# Patient Record
Sex: FEMALE | Race: WHITE | Employment: STUDENT | URBAN - METROPOLITAN AREA
[De-identification: names, ages, dates, MRNs, and addresses within clinical notes are randomized per-mention and may not be internally consistent; named-entity substitution may affect disease eponyms.]

---

## 2019-05-19 ENCOUNTER — HOSPITAL ENCOUNTER (EMERGENCY)
Facility: CLINIC | Age: 24
Discharge: HOME | End: 2019-05-19
Attending: EMERGENCY MEDICINE | Admitting: EMERGENCY MEDICINE
Payer: COMMERCIAL

## 2019-05-19 ENCOUNTER — APPOINTMENT (OUTPATIENT)
Dept: GENERAL RADIOLOGY | Facility: CLINIC | Age: 24
End: 2019-05-19
Attending: EMERGENCY MEDICINE
Payer: COMMERCIAL

## 2019-05-19 ENCOUNTER — APPOINTMENT (OUTPATIENT)
Dept: CT IMAGING | Facility: CLINIC | Age: 24
End: 2019-05-19
Attending: EMERGENCY MEDICINE
Payer: COMMERCIAL

## 2019-05-19 VITALS
WEIGHT: 130 LBS | TEMPERATURE: 98.6 F | SYSTOLIC BLOOD PRESSURE: 134 MMHG | HEIGHT: 66 IN | HEART RATE: 94 BPM | BODY MASS INDEX: 20.89 KG/M2 | RESPIRATION RATE: 14 BRPM | OXYGEN SATURATION: 99 % | DIASTOLIC BLOOD PRESSURE: 89 MMHG

## 2019-05-19 DIAGNOSIS — R56.9 SEIZURES (H): ICD-10-CM

## 2019-05-19 DIAGNOSIS — R55 VASOVAGAL EPISODE: ICD-10-CM

## 2019-05-19 LAB
ALBUMIN SERPL-MCNC: 4 G/DL (ref 3.4–5)
ALBUMIN UR-MCNC: 10 MG/DL
ALP SERPL-CCNC: 54 U/L (ref 40–150)
ALT SERPL W P-5'-P-CCNC: 21 U/L (ref 0–50)
AMPHETAMINES UR QL SCN: POSITIVE
ANION GAP SERPL CALCULATED.3IONS-SCNC: 12 MMOL/L (ref 3–14)
APPEARANCE UR: CLEAR
APTT PPP: 25 SEC (ref 22–37)
AST SERPL W P-5'-P-CCNC: 13 U/L (ref 0–45)
BACTERIA #/AREA URNS HPF: ABNORMAL /HPF
BARBITURATES UR QL: NEGATIVE
BASOPHILS # BLD AUTO: 0 10E9/L (ref 0–0.2)
BASOPHILS NFR BLD AUTO: 0.4 %
BENZODIAZ UR QL: NEGATIVE
BILIRUB SERPL-MCNC: 0.5 MG/DL (ref 0.2–1.3)
BILIRUB UR QL STRIP: NEGATIVE
BUN SERPL-MCNC: 7 MG/DL (ref 7–30)
CALCIUM SERPL-MCNC: 8.4 MG/DL (ref 8.5–10.1)
CANNABINOIDS UR QL SCN: POSITIVE
CHLORIDE SERPL-SCNC: 109 MMOL/L (ref 94–109)
CO2 SERPL-SCNC: 21 MMOL/L (ref 20–32)
COCAINE UR QL: NEGATIVE
COLOR UR AUTO: ABNORMAL
CREAT SERPL-MCNC: 0.85 MG/DL (ref 0.52–1.04)
D DIMER PPP FEU-MCNC: <0.3 UG/ML FEU (ref 0–0.5)
DIFFERENTIAL METHOD BLD: NORMAL
EOSINOPHIL # BLD AUTO: 0 10E9/L (ref 0–0.7)
EOSINOPHIL NFR BLD AUTO: 0.6 %
ERYTHROCYTE [DISTWIDTH] IN BLOOD BY AUTOMATED COUNT: 11.9 % (ref 10–15)
ETHANOL UR QL SCN: NEGATIVE
GFR SERPL CREATININE-BSD FRML MDRD: >90 ML/MIN/{1.73_M2}
GLUCOSE SERPL-MCNC: 92 MG/DL (ref 70–99)
GLUCOSE UR STRIP-MCNC: NEGATIVE MG/DL
HCG SERPL QL: NEGATIVE
HCT VFR BLD AUTO: 44.7 % (ref 35–47)
HGB BLD-MCNC: 15 G/DL (ref 11.7–15.7)
HGB UR QL STRIP: ABNORMAL
HYALINE CASTS #/AREA URNS LPF: 7 /LPF (ref 0–2)
IMM GRANULOCYTES # BLD: 0 10E9/L (ref 0–0.4)
IMM GRANULOCYTES NFR BLD: 0.4 %
INR PPP: 0.95 (ref 0.86–1.14)
KETONES UR STRIP-MCNC: 10 MG/DL
LEUKOCYTE ESTERASE UR QL STRIP: NEGATIVE
LYMPHOCYTES # BLD AUTO: 2 10E9/L (ref 0.8–5.3)
LYMPHOCYTES NFR BLD AUTO: 28.3 %
MAGNESIUM SERPL-MCNC: 2.6 MG/DL (ref 1.6–2.3)
MCH RBC QN AUTO: 31.1 PG (ref 26.5–33)
MCHC RBC AUTO-ENTMCNC: 33.6 G/DL (ref 31.5–36.5)
MCV RBC AUTO: 93 FL (ref 78–100)
MONOCYTES # BLD AUTO: 0.7 10E9/L (ref 0–1.3)
MONOCYTES NFR BLD AUTO: 10 %
MUCOUS THREADS #/AREA URNS LPF: PRESENT /LPF
NEUTROPHILS # BLD AUTO: 4.3 10E9/L (ref 1.6–8.3)
NEUTROPHILS NFR BLD AUTO: 60.3 %
NITRATE UR QL: NEGATIVE
NRBC # BLD AUTO: 0 10*3/UL
NRBC BLD AUTO-RTO: 0 /100
OPIATES UR QL SCN: NEGATIVE
PH UR STRIP: 5 PH (ref 5–7)
PLATELET # BLD AUTO: 314 10E9/L (ref 150–450)
POTASSIUM SERPL-SCNC: 3.2 MMOL/L (ref 3.4–5.3)
PROT SERPL-MCNC: 7.5 G/DL (ref 6.8–8.8)
RBC # BLD AUTO: 4.83 10E12/L (ref 3.8–5.2)
RBC #/AREA URNS AUTO: 1 /HPF (ref 0–2)
SODIUM SERPL-SCNC: 142 MMOL/L (ref 133–144)
SOURCE: ABNORMAL
SP GR UR STRIP: 1.01 (ref 1–1.03)
SQUAMOUS #/AREA URNS AUTO: 1 /HPF (ref 0–1)
TROPONIN I SERPL-MCNC: <0.015 UG/L (ref 0–0.04)
UROBILINOGEN UR STRIP-MCNC: NORMAL MG/DL (ref 0–2)
WBC # BLD AUTO: 7.2 10E9/L (ref 4–11)
WBC #/AREA URNS AUTO: 1 /HPF (ref 0–5)

## 2019-05-19 PROCEDURE — 85379 FIBRIN DEGRADATION QUANT: CPT | Performed by: EMERGENCY MEDICINE

## 2019-05-19 PROCEDURE — 80307 DRUG TEST PRSMV CHEM ANLYZR: CPT | Performed by: EMERGENCY MEDICINE

## 2019-05-19 PROCEDURE — 85610 PROTHROMBIN TIME: CPT | Performed by: EMERGENCY MEDICINE

## 2019-05-19 PROCEDURE — 81001 URINALYSIS AUTO W/SCOPE: CPT | Performed by: EMERGENCY MEDICINE

## 2019-05-19 PROCEDURE — 70450 CT HEAD/BRAIN W/O DYE: CPT

## 2019-05-19 PROCEDURE — 83735 ASSAY OF MAGNESIUM: CPT | Performed by: EMERGENCY MEDICINE

## 2019-05-19 PROCEDURE — 85025 COMPLETE CBC W/AUTO DIFF WBC: CPT | Performed by: EMERGENCY MEDICINE

## 2019-05-19 PROCEDURE — 25000128 H RX IP 250 OP 636: Performed by: EMERGENCY MEDICINE

## 2019-05-19 PROCEDURE — 93010 ELECTROCARDIOGRAM REPORT: CPT | Mod: Z6 | Performed by: EMERGENCY MEDICINE

## 2019-05-19 PROCEDURE — 80053 COMPREHEN METABOLIC PANEL: CPT | Performed by: EMERGENCY MEDICINE

## 2019-05-19 PROCEDURE — 99285 EMERGENCY DEPT VISIT HI MDM: CPT | Mod: 25 | Performed by: EMERGENCY MEDICINE

## 2019-05-19 PROCEDURE — 84703 CHORIONIC GONADOTROPIN ASSAY: CPT | Performed by: EMERGENCY MEDICINE

## 2019-05-19 PROCEDURE — 96374 THER/PROPH/DIAG INJ IV PUSH: CPT | Performed by: EMERGENCY MEDICINE

## 2019-05-19 PROCEDURE — 71045 X-RAY EXAM CHEST 1 VIEW: CPT

## 2019-05-19 PROCEDURE — 80320 DRUG SCREEN QUANTALCOHOLS: CPT | Performed by: EMERGENCY MEDICINE

## 2019-05-19 PROCEDURE — 93005 ELECTROCARDIOGRAM TRACING: CPT | Performed by: EMERGENCY MEDICINE

## 2019-05-19 PROCEDURE — 85730 THROMBOPLASTIN TIME PARTIAL: CPT | Performed by: EMERGENCY MEDICINE

## 2019-05-19 PROCEDURE — 84484 ASSAY OF TROPONIN QUANT: CPT | Performed by: EMERGENCY MEDICINE

## 2019-05-19 PROCEDURE — 96361 HYDRATE IV INFUSION ADD-ON: CPT | Performed by: EMERGENCY MEDICINE

## 2019-05-19 RX ORDER — SODIUM CHLORIDE 9 MG/ML
1000 INJECTION, SOLUTION INTRAVENOUS CONTINUOUS
Status: DISCONTINUED | OUTPATIENT
Start: 2019-05-19 | End: 2019-05-20 | Stop reason: HOSPADM

## 2019-05-19 RX ORDER — BUPROPION HYDROCHLORIDE 150 MG/1
150 TABLET ORAL EVERY MORNING
COMMUNITY

## 2019-05-19 RX ORDER — DEXTROAMPHETAMINE SACCHARATE, AMPHETAMINE ASPARTATE, DEXTROAMPHETAMINE SULFATE AND AMPHETAMINE SULFATE 2.5; 2.5; 2.5; 2.5 MG/1; MG/1; MG/1; MG/1
10 TABLET ORAL PRN
COMMUNITY

## 2019-05-19 RX ORDER — ONDANSETRON 2 MG/ML
4 INJECTION INTRAMUSCULAR; INTRAVENOUS ONCE
Status: COMPLETED | OUTPATIENT
Start: 2019-05-19 | End: 2019-05-19

## 2019-05-19 RX ADMIN — SODIUM CHLORIDE 1000 ML: 9 INJECTION, SOLUTION INTRAVENOUS at 20:28

## 2019-05-19 RX ADMIN — ONDANSETRON 4 MG: 2 INJECTION INTRAMUSCULAR; INTRAVENOUS at 20:43

## 2019-05-19 ASSESSMENT — ENCOUNTER SYMPTOMS
SEIZURES: 1
DIZZINESS: 1
VOMITING: 0
UNEXPECTED WEIGHT CHANGE: 1
NAUSEA: 1

## 2019-05-19 ASSESSMENT — MIFFLIN-ST. JEOR: SCORE: 1366.43

## 2019-05-19 NOTE — ED AVS SNAPSHOT
[facility], [place], Emergency Department  [address] [place] [id number]  Phone:  [phone]                                    Ramiro Márquez   MRN: [id number]    Department:  [facility], Emergency Department   Date of Visit:  5/19/2019           After Visit Summary Signature Page    I have received my discharge instructions, and my questions have been answered. I have discussed any challenges I see with this plan with the nurse or doctor.    ..........................................................................................................................................  Patient/Patient Representative Signature      ..........................................................................................................................................  Patient Representative Print Name and Relationship to Patient    ..................................................               ................................................  Date                                   Time    ..........................................................................................................................................  Reviewed by Signature/Title    ...................................................              ..............................................  Date                                               Time          22EPIC Rev 08/18

## 2019-05-20 NOTE — DISCHARGE INSTRUCTIONS
Please make sure that you do not drive or do not any dangerous activities do not climb high places until you have been cleared by neurology for having seizures.  This is very important because if you have a seizure while you are doing this you could get hurt.  Also if another seizure returns need to return to emergency department for further evaluation.    If you have another seizure or any other concerns develop please return to emergency department as soon as possible otherwise follow-up follow-up with the neurology clinic as soon as possible    DO not take Wellbutrim along with Ritalin.   Moreover, the combination of Sertraline and wellbitrin may also cause seizures. Please follow up with your PCP for alternatves. THC or marijiuna should not be continued    Please make an appointment to follow up with Neurology Clinic (phone: [phone]) in 2 days even if entirely better.

## 2019-05-20 NOTE — ED PROVIDER NOTES
[place] EMERGENCY DEPARTMENT ([facility])  5/19/19   History     Chief Complaint   Patient presents with     Seizures     The history is provided by the patient and a parent.     Ramiro Márquez is a 23 year old female with a medical history significant for depression, anxiety and ADD who presents to the Emergency Department for evaluation of a possible seizure.  The patient was in an auditorium with her parents when she suddenly said that she was very dizzy.  The patient also reports that she also reports having some nausea as well as an abnormal taste in her mouth.  The mother reports that the patient then lost consciousness and the mother reports that she went stiff and believed that she saw some generalized shaking.  When the patient woke up, she did not remember the event.  The patient denies any numbness or weakness.  The patient has had a recent 15 pound weight loss and she reports that for the last 3 months she has had intermittent episodes of this dizziness, nausea and abnormal taste in her mouth, but she has not lost consciousness with any of these episodes.  The patient denies ever having a syncopal/possible seizure episode in the past.    I have reviewed the Medications, Allergies, Past Medical and Surgical History, and Social History in the [employer] system.    Past Medical History:   Diagnosis Date     ADD (attention deficit disorder)      Anxiety      Depressive disorder        History reviewed. No pertinent surgical history.    History reviewed. No pertinent family history.    Social History     Tobacco Use     Smoking status: Never Smoker     Smokeless tobacco: Never Used   Substance Use Topics     Alcohol use: Yes     Comment: socially       Current Facility-Administered Medications   Medication     sodium chloride 0.9% infusion     Current Outpatient Medications   Medication     amphetamine-dextroamphetamine (ADDERALL) 10 MG tablet     buPROPion (WELLBUTRIN XL) 150 MG 24 hr tablet       "No Known Allergies      Review of Systems   Constitutional: Positive for unexpected weight change.   Gastrointestinal: Positive for nausea. Negative for vomiting.   Neurological: Positive for dizziness, seizures (possible) and syncope.   All other systems reviewed and are negative.      Physical Exam   BP: (!) 157/102  Heart Rate: 109  Temp: 98.6  F (37  C)  Resp: 16  Height: 167.6 cm (5' 6\")  Weight: 59 kg (130 lb)  SpO2: 100 %      Physical Exam   Constitutional: She is oriented to person, place, and time. No distress.   HENT:   Head: Normocephalic and atraumatic.   Eyes: Pupils are equal, round, and reactive to light. Conjunctivae and EOM are normal.   Neck: Normal range of motion. Neck supple.   Cardiovascular: Normal rate and regular rhythm. Exam reveals no gallop and no friction rub.   No murmur heard.  Pulmonary/Chest: Effort normal and breath sounds normal. No respiratory distress.   Abdominal: Soft. There is no tenderness.   Musculoskeletal: She exhibits no edema or tenderness.   Neurological: She is alert and oriented to person, place, and time. She has normal strength. No cranial nerve deficit or sensory deficit. She displays a negative Romberg sign.   Skin: Skin is warm.   Psychiatric: She has a normal mood and affect. Her behavior is normal. Judgment and thought content normal.   Nursing note and vitals reviewed.      ED Course   8:01 PM  The patient was seen and examined by Ricco Wooten MD in Room ED03.     This patient was just evaluated by myself she appears to be mildly anxious physical examination is nonfocal and her symptoms are equivocal as to whether she had a syncopal episode with the perfusion syndrome versus a an actual syncope.  Plan to evaluate for both will get neurology consultation will disposition whether work-up has been completed    Addendum 2024 hours  I have spoken to neurology regarding the findings physical exam and history at this point neurology agrees with the fact that she " needs to be worked up however they feel that if nothing acute is shown she can do an outpatient follow-up for an EEG and MRI.    Addendum 2253 hrs. at this point the work-up has been negative patient was noted to be taking Wellbutrin along with sertraline and Ritalin.  Furthermore it was noted by the urinalysis that she is also used THC.  It is believed that the combination of these medications with a PAC may also predispose to seizures.  I have spoken to the patient and she will be followed up in neurology clinic to make sure that there is no evidence of any neurological abnormalities and she will also follow-up with your primary care doctor to see what medication need to be taken off in order to avoid any further interaction.       Procedures  None       EKG Interpretation:      Interpreted by Ricco Wooten  Time reviewed: 2006  Symptoms at time of EKG: None  Rhythm:Sinust tach   Rate: 107  Axis: normal  Ectopy: none  Conduction: normal  ST Segments/ T Waves: No ST-T wave changes  Q Waves: none  Comparison to prior: No old EKG available    Clinical Impression: normal EKG          Critical Care time:  none             Labs Ordered and Resulted from Time of ED Arrival Up to the Time of Departure from the ED   COMPREHENSIVE METABOLIC PANEL - Abnormal; Notable for the following components:       Result Value    Potassium 3.2 (*)     Calcium 8.4 (*)     All other components within normal limits   UA MACROSCOPIC WITH REFLEX TO MICRO AND CULTURE - Abnormal; Notable for the following components:    Ketones Urine 10 (*)     Blood Urine Trace (*)     Protein Albumin Urine 10 (*)     Bacteria Urine Few (*)     Mucous Urine Present (*)     Hyaline Casts 7 (*)     All other components within normal limits   DRUG ABUSE SCREEN 6 CHEM DEP URINE ([facility]) - Abnormal; Notable for the following components:    Amphetamine Qual Urine Positive (*)     Cannabinoids Qual Urine Positive (*)     All other components within normal limits    MAGNESIUM - Abnormal; Notable for the following components:    Magnesium 2.6 (*)     All other components within normal limits   CBC WITH PLATELETS DIFFERENTIAL   D DIMER QUANTITATIVE   INR   PARTIAL THROMBOPLASTIN TIME   TROPONIN I   HCG QUALITATIVE   PERIPHERAL IV CATHETER   CARDIAC CONTINUOUS MONITORING            Assessments & Plan (with Medical Decision Making)   This is a 33-year-old female that comes in for the to the emergency department for evaluation of what mom describes as a seizure and what may have been an emergence reaction from a syncope (vasovagal).  I plan to evaluate this patient by getting a cardiac and neurological work-up furthermore I will get a neurology consult and disposition her as needed.    Addendum at 2145 hrs. this point the work-up is so far negative her vital signs are stable upon arrival she was not slightly tachycardic that was likely because she was anxious but that has now resolved.  I have spoken to neurology and the patient needs to follow-up as outpatient for further testing and treatments I plan to discharge her for further evaluation by neurology I have told the mom and the patient that if this happens again she will need to come back immediately and at that point if she has another episode she would need to be admitted    I have reviewed the nursing notes.    I have reviewed the findings, diagnosis, plan and need for follow up with the patient.       Medication List      There are no discharge medications for this visit.         Final diagnoses:   Seizures (H)   Vasovagal episode     IBernard, am serving as a trained medical scribe to document services personally performed by Ricco Wooten MD, based on the provider's statements to me.   IRicco MD, was physically present and have reviewed and verified the accuracy of this note documented by Bernard Marlow.    5/19/2019   [facility], EMERGENCY DEPARTMENT     Ricco Wooten MD  05/21/19  [id number]

## 2019-05-20 NOTE — ED TRIAGE NOTES
"Pt. BIBA from [facility] after having a 1st seizure that lasted about 1 minute in duration. Pt. Was with mother and father waiting for the show to start when all of a sudden she said \"woah\", flew back into her seat, and began having what family and EMS describe as a tonic clonic seizure. Mother reports patients eyes were rolled back in her head, her jaw was clenched and popping, her back was arched, and her arms and legs were shaking vigorously. When EMS arrived patient was post ictal, diaphoretic, pale. Pt. Was slightly hypertensive, tachycardic in 130s, O2 sats WNL on RA. EMS placed 18 g IV in R arm and gave 4 mg of zofran. Upon arrival pt. A & O x 4 but diaphoretic and tearful. Pt. Reports that today she was feeling dizzy and nauseated and about 20 mins prior to seizure developed a \"weird\" taste in their mouth. Pt. Also reports that she has been feeling nauseated, more dizzy, heat sensitive, profuse diaphoresis that happens out of nowhere, and developing weird tastes in her mouth. Upon arrival, HTN, tachycardia in low 100s, OVSS on RA. Parents @ bedside.    "

## 2019-11-25 LAB — INTERPRETATION ECG - MUSE: NORMAL
